# Patient Record
Sex: MALE | Race: WHITE | NOT HISPANIC OR LATINO | Employment: STUDENT | URBAN - NONMETROPOLITAN AREA
[De-identification: names, ages, dates, MRNs, and addresses within clinical notes are randomized per-mention and may not be internally consistent; named-entity substitution may affect disease eponyms.]

---
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
## 2025-02-20 ENCOUNTER — OFFICE VISIT (OUTPATIENT)
Dept: FAMILY MEDICINE | Facility: CLINIC | Age: 20
End: 2025-02-20
Payer: COMMERCIAL

## 2025-02-20 VITALS
OXYGEN SATURATION: 100 % | RESPIRATION RATE: 20 BRPM | DIASTOLIC BLOOD PRESSURE: 64 MMHG | SYSTOLIC BLOOD PRESSURE: 105 MMHG | HEART RATE: 67 BPM | WEIGHT: 132.6 LBS | HEIGHT: 70 IN | TEMPERATURE: 97 F | BODY MASS INDEX: 18.98 KG/M2

## 2025-02-20 DIAGNOSIS — R20.2 PARESTHESIA: Primary | ICD-10-CM

## 2025-02-20 LAB
ANION GAP SERPL CALCULATED.3IONS-SCNC: 9 MMOL/L (ref 7–15)
BUN SERPL-MCNC: 11.9 MG/DL (ref 6–20)
CALCIUM SERPL-MCNC: 9.3 MG/DL (ref 8.8–10.4)
CHLORIDE SERPL-SCNC: 104 MMOL/L (ref 98–107)
CREAT SERPL-MCNC: 0.98 MG/DL (ref 0.67–1.17)
D DIMER PPP FEU-MCNC: <0.27 UG/ML FEU (ref 0–0.5)
EGFRCR SERPLBLD CKD-EPI 2021: >90 ML/MIN/1.73M2
ERYTHROCYTE [DISTWIDTH] IN BLOOD BY AUTOMATED COUNT: 12 % (ref 10–15)
ERYTHROCYTE [SEDIMENTATION RATE] IN BLOOD BY WESTERGREN METHOD: 1 MM/HR (ref 0–15)
EST. AVERAGE GLUCOSE BLD GHB EST-MCNC: 105 MG/DL
GLUCOSE SERPL-MCNC: 99 MG/DL (ref 70–99)
HBA1C MFR BLD: 5.3 % (ref 0–5.6)
HCO3 SERPL-SCNC: 26 MMOL/L (ref 22–29)
HCT VFR BLD AUTO: 42.5 % (ref 40–53)
HGB BLD-MCNC: 14.4 G/DL (ref 13.3–17.7)
MCH RBC QN AUTO: 29.9 PG (ref 26.5–33)
MCHC RBC AUTO-ENTMCNC: 33.9 G/DL (ref 31.5–36.5)
MCV RBC AUTO: 88 FL (ref 78–100)
PLATELET # BLD AUTO: 202 10E3/UL (ref 150–450)
POTASSIUM SERPL-SCNC: 4.1 MMOL/L (ref 3.4–5.3)
RBC # BLD AUTO: 4.82 10E6/UL (ref 4.4–5.9)
SODIUM SERPL-SCNC: 139 MMOL/L (ref 135–145)
TSH SERPL DL<=0.005 MIU/L-ACNC: 1.07 UIU/ML (ref 0.5–4.3)
WBC # BLD AUTO: 4.8 10E3/UL (ref 4–11)

## 2025-02-20 ASSESSMENT — PAIN SCALES - GENERAL: PAINLEVEL_OUTOF10: NO PAIN (0)

## 2025-02-20 NOTE — PROGRESS NOTES
"  Assessment & Plan     Paresthesia  19-year-old male presented with right lower leg paresthesia-like symptoms which started few days ago.  Physical examination overall unremarkable.  Differentials discussed in detail including but not limited to nerve impingement, radiculopathy, musculoskeletal and metabolic etiology.  CBC, BMP, B12, hemoglobin A1c, TSH, ESR, D-dimer and EMG ordered for further evaluation.  Suggested well hydration, healthy diet and to follow-up in 2 to 4 weeks or earlier as needed.  Patient understood and in agreement with above plan.  All questiosn answered.  - REVIEW OF HEALTH MAINTENANCE PROTOCOL ORDERS  - CBC with platelets; Future  - Basic metabolic panel  (Ca, Cl, CO2, Creat, Gluc, K, Na, BUN); Future  - Vitamin B12; Future  - Hemoglobin A1c; Future  - EMG; Future  - TSH with free T4 reflex; Future  - ESR: Erythrocyte sedimentation rate; Future      Subjective   Fabio is a 19 year old, presenting for the following health issues:  Musculoskeletal Problem        2/20/2025    12:52 PM   Additional Questions   Roomed by Shana TYLER LPN   Accompanied by self     History of Present Illness       Reason for visit:  Numbness and dicomfort in my right leg   He is taking medications regularly.  Started with feeling like foot fell asleep and now it has continues to grow up the leg. Sometimes he can tap his toes on the floor and not feel it. No known injuries. No swelling or redness.       Review of Systems  Constitutional, HEENT, cardiovascular, pulmonary, gi and gu systems are negative, except as otherwise noted.      Objective    /64   Pulse 67   Temp 97  F (36.1  C) (Tympanic)   Resp 20   Ht 1.765 m (5' 9.5\")   Wt 60.1 kg (132 lb 9.6 oz)   SpO2 100%   BMI 19.30 kg/m    Body mass index is 19.3 kg/m .  Physical Exam   GENERAL: alert and no distress  EYES: Eyes grossly normal to inspection, PERRL and conjunctivae and sclerae normal  HENT: normal cephalic/atraumatic, nose and mouth without " ulcers or lesions, oropharynx clear, and oral mucous membranes moist  NECK: no adenopathy, no asymmetry, masses, or scars  RESP: no wheezes  MS: no gross musculoskeletal defects noted, no edema  ORTHO: No joint swelling or skin discoloration noted, normal gait, peripheral pulses 3+  SKIN: no suspicious lesions or rashes  NEURO: Normal strength and tone, sensory exam grossly normal, mentation intact, cranial nerves 2-12 intact, and DTR's normal and symmetric bilaterally  PSYCH: mentation appears normal, affect normal/bright        Signed Electronically by: Angel Aden MD